# Patient Record
Sex: MALE | URBAN - METROPOLITAN AREA
[De-identification: names, ages, dates, MRNs, and addresses within clinical notes are randomized per-mention and may not be internally consistent; named-entity substitution may affect disease eponyms.]

---

## 2021-11-24 ENCOUNTER — DOCTOR'S OFFICE (OUTPATIENT)
Dept: URBAN - METROPOLITAN AREA CLINIC 157 | Facility: CLINIC | Age: 71
Setting detail: OPHTHALMOLOGY
End: 2021-11-24
Payer: COMMERCIAL

## 2021-11-24 PROBLEM — H43.811 VITREOUS DEGENERATION; RIGHT EYE: Status: ACTIVE | Noted: 2021-11-24

## 2021-11-24 PROBLEM — H04.123 DRY EYE SYNDROME LACRIMAL GLAND; BOTH EYES: Status: ACTIVE | Noted: 2021-11-24

## 2021-11-24 PROBLEM — Z96.1 PRESENCE OF INTRAOCULAR LENS ; BOTH EYES: Status: ACTIVE | Noted: 2021-11-24

## 2021-11-24 PROBLEM — H01.001 BLEPHARITIS; RIGHT UPPER LID, LEFT UPPER LID: Status: ACTIVE | Noted: 2021-11-24

## 2021-11-24 PROBLEM — H26.493 OTHER SECONDARY CATARACT; BOTH EYES: Status: ACTIVE | Noted: 2021-11-24

## 2021-11-24 PROBLEM — H01.004 BLEPHARITIS; RIGHT UPPER LID, LEFT UPPER LID: Status: ACTIVE | Noted: 2021-11-24

## 2021-11-24 PROCEDURE — 92014 COMPRE OPH EXAM EST PT 1/>: CPT | Performed by: OPTOMETRIST

## 2021-11-24 ASSESSMENT — SPHEQUIV_DERIVED
OS_SPHEQUIV: 0.25
OD_SPHEQUIV: 0.5

## 2021-11-24 ASSESSMENT — KERATOMETRY
OS_AXISANGLE_DEGREES: 159
OD_K1POWER_DIOPTERS: 42.75
OD_AXISANGLE_DEGREES: 032
OS_K2POWER_DIOPTERS: 44.00
OD_K2POWER_DIOPTERS: 43.00
OS_K1POWER_DIOPTERS: 43.00

## 2021-11-24 ASSESSMENT — REFRACTION_AUTOREFRACTION
OD_AXIS: 008
OS_AXIS: 160
OD_CYLINDER: +0.50
OD_SPHERE: +0.25
OS_CYLINDER: +1.50
OS_SPHERE: -0.50

## 2021-11-24 ASSESSMENT — TONOMETRY
OD_IOP_MMHG: 18
OS_IOP_MMHG: 15

## 2021-11-24 ASSESSMENT — CONFRONTATIONAL VISUAL FIELD TEST (CVF)
OS_FINDINGS: FULL
OD_FINDINGS: FULL

## 2021-11-24 ASSESSMENT — AXIALLENGTH_DERIVED
OD_AL: 23.6267
OS_AL: 23.4949

## 2021-11-24 ASSESSMENT — VISUAL ACUITY
OD_BCVA: 20/25
OS_BCVA: 20/25

## 2021-11-24 ASSESSMENT — TEAR BREAK UP TIME (TBUT)
OS_TBUT: 7SEC
OD_TBUT: 7SEC

## 2021-11-24 ASSESSMENT — LID EXAM ASSESSMENTS
OS_BLEPHARITIS: LUL T
OD_BLEPHARITIS: RUL T

## 2022-12-07 ENCOUNTER — DOCTOR'S OFFICE (OUTPATIENT)
Dept: URBAN - METROPOLITAN AREA CLINIC 161 | Facility: CLINIC | Age: 72
Setting detail: OPHTHALMOLOGY
End: 2022-12-07
Payer: COMMERCIAL

## 2022-12-07 DIAGNOSIS — H01.004: ICD-10-CM

## 2022-12-07 DIAGNOSIS — H01.001: ICD-10-CM

## 2022-12-07 DIAGNOSIS — H40.013: ICD-10-CM

## 2022-12-07 DIAGNOSIS — H43.811: ICD-10-CM

## 2022-12-07 DIAGNOSIS — Z96.1: ICD-10-CM

## 2022-12-07 DIAGNOSIS — H04.123: ICD-10-CM

## 2022-12-07 DIAGNOSIS — H26.493: ICD-10-CM

## 2022-12-07 PROCEDURE — 92133 CPTRZD OPH DX IMG PST SGM ON: CPT | Performed by: OPTOMETRIST

## 2022-12-07 PROCEDURE — 92014 COMPRE OPH EXAM EST PT 1/>: CPT | Performed by: OPTOMETRIST

## 2022-12-07 ASSESSMENT — LID EXAM ASSESSMENTS
OD_BLEPHARITIS: RUL T
OS_BLEPHARITIS: LUL T

## 2022-12-07 ASSESSMENT — REFRACTION_MANIFEST
OS_VA1: 20/20
OS_SPHERE: +0.25
OS_ADD: +2.50
OD_CYLINDER: +1.00
OD_SPHERE: +0.75
OS_CYLINDER: +1.25
OD_AXIS: 030
OS_AXIS: 165
OD_VA1: 20/20
OD_ADD: +2.50

## 2022-12-07 ASSESSMENT — SPHEQUIV_DERIVED
OS_SPHEQUIV: 0.875
OD_SPHEQUIV: 1.25
OS_SPHEQUIV: 0.5
OD_SPHEQUIV: 0.875

## 2022-12-07 ASSESSMENT — TEAR BREAK UP TIME (TBUT)
OD_TBUT: 7SEC
OS_TBUT: 7SEC

## 2022-12-07 ASSESSMENT — AXIALLENGTH_DERIVED
OS_AL: 23.3536
OD_AL: 23.4357
OD_AL: 23.2924
OS_AL: 23.2113

## 2022-12-07 ASSESSMENT — KERATOMETRY
OD_K2POWER_DIOPTERS: 43.00
OD_AXISANGLE_DEGREES: 032
OD_K1POWER_DIOPTERS: 43.00
OS_AXISANGLE_DEGREES: 159
OS_K1POWER_DIOPTERS: 43.25
OS_K2POWER_DIOPTERS: 44.00

## 2022-12-07 ASSESSMENT — CONFRONTATIONAL VISUAL FIELD TEST (CVF)
OS_FINDINGS: FULL
OD_FINDINGS: FULL

## 2022-12-07 ASSESSMENT — REFRACTION_AUTOREFRACTION
OS_CYLINDER: +1.50
OD_AXIS: 020
OS_AXIS: 166
OD_SPHERE: +0.50
OS_SPHERE: -0.25
OD_CYLINDER: +0.75

## 2022-12-07 ASSESSMENT — VISUAL ACUITY
OD_BCVA: 20/40
OS_BCVA: 20/40-2

## 2023-01-04 ENCOUNTER — DOCTOR'S OFFICE (OUTPATIENT)
Dept: URBAN - METROPOLITAN AREA CLINIC 161 | Facility: CLINIC | Age: 73
Setting detail: OPHTHALMOLOGY
End: 2023-01-04
Payer: COMMERCIAL

## 2023-01-04 DIAGNOSIS — H40.013: ICD-10-CM

## 2023-01-04 PROCEDURE — 76514 ECHO EXAM OF EYE THICKNESS: CPT | Performed by: OPTOMETRIST

## 2023-01-04 PROCEDURE — 99213 OFFICE O/P EST LOW 20 MIN: CPT | Performed by: OPTOMETRIST

## 2023-01-04 PROCEDURE — 92083 EXTENDED VISUAL FIELD XM: CPT | Performed by: OPTOMETRIST

## 2023-01-04 ASSESSMENT — REFRACTION_AUTOREFRACTION
OD_AXIS: 020
OD_SPHERE: +0.50
OS_SPHERE: -0.25
OD_CYLINDER: +0.75
OS_CYLINDER: +1.50
OS_AXIS: 166

## 2023-01-04 ASSESSMENT — REFRACTION_MANIFEST
OD_VA1: 20/20
OD_SPHERE: +0.75
OS_CYLINDER: +1.25
OD_AXIS: 030
OD_CYLINDER: +1.00
OS_AXIS: 165
OS_SPHERE: +0.25
OD_ADD: +2.50
OS_ADD: +2.50
OS_VA1: 20/20

## 2023-01-04 ASSESSMENT — LID EXAM ASSESSMENTS
OD_BLEPHARITIS: RUL T
OS_BLEPHARITIS: LUL T

## 2023-01-04 ASSESSMENT — KERATOMETRY
OS_AXISANGLE_DEGREES: 159
OS_K1POWER_DIOPTERS: 43.25
OD_K2POWER_DIOPTERS: 43.00
OD_K1POWER_DIOPTERS: 43.00
OD_AXISANGLE_DEGREES: 032
OS_K2POWER_DIOPTERS: 44.00

## 2023-01-04 ASSESSMENT — PACHYMETRY
OS_CT_UM: 512
OD_CT_UM: 543
OD_CT_CORRECTION: 0
OS_CT_CORRECTION: 2

## 2023-01-04 ASSESSMENT — AXIALLENGTH_DERIVED
OD_AL: 23.4357
OS_AL: 23.3536
OS_AL: 23.2113
OD_AL: 23.2924

## 2023-01-04 ASSESSMENT — TONOMETRY
OS_IOP_MMHG: 21
OS_IOP_MMHG: 18
OD_IOP_MMHG: 21

## 2023-01-04 ASSESSMENT — SPHEQUIV_DERIVED
OS_SPHEQUIV: 0.5
OD_SPHEQUIV: 1.25
OD_SPHEQUIV: 0.875
OS_SPHEQUIV: 0.875

## 2023-01-04 ASSESSMENT — VISUAL ACUITY
OS_BCVA: 20/40-2
OD_BCVA: 20/40

## 2023-01-04 ASSESSMENT — TEAR BREAK UP TIME (TBUT)
OD_TBUT: 7SEC
OS_TBUT: 7SEC

## 2023-01-04 ASSESSMENT — CONFRONTATIONAL VISUAL FIELD TEST (CVF)
OS_FINDINGS: FULL
OD_FINDINGS: FULL

## 2023-01-23 ENCOUNTER — OFFICE VISIT (OUTPATIENT)
Dept: CARDIOLOGY | Facility: CLINIC | Age: 73
End: 2023-01-23
Payer: MEDICARE

## 2023-01-23 VITALS
WEIGHT: 183 LBS | OXYGEN SATURATION: 89 % | DIASTOLIC BLOOD PRESSURE: 68 MMHG | HEIGHT: 65 IN | BODY MASS INDEX: 30.49 KG/M2 | HEART RATE: 68 BPM | SYSTOLIC BLOOD PRESSURE: 120 MMHG

## 2023-01-23 DIAGNOSIS — R29.898 WEAKNESS OF BOTH LOWER EXTREMITIES: ICD-10-CM

## 2023-01-23 DIAGNOSIS — M79.604 BILATERAL LEG PAIN: ICD-10-CM

## 2023-01-23 DIAGNOSIS — I73.9 CLAUDICATION: ICD-10-CM

## 2023-01-23 DIAGNOSIS — R06.02 SHORTNESS OF BREATH: Primary | ICD-10-CM

## 2023-01-23 DIAGNOSIS — I10 PRIMARY HYPERTENSION: ICD-10-CM

## 2023-01-23 DIAGNOSIS — M79.605 BILATERAL LEG PAIN: ICD-10-CM

## 2023-01-23 PROCEDURE — 99204 OFFICE O/P NEW MOD 45 MIN: CPT | Performed by: PHYSICIAN ASSISTANT

## 2023-01-23 PROCEDURE — 93000 ELECTROCARDIOGRAM COMPLETE: CPT | Performed by: PHYSICIAN ASSISTANT

## 2023-01-23 RX ORDER — PREDNISONE 1 MG/1
5 TABLET ORAL DAILY
COMMUNITY
Start: 2022-12-28

## 2023-01-23 RX ORDER — SERTRALINE HYDROCHLORIDE 100 MG/1
200 TABLET, FILM COATED ORAL DAILY
COMMUNITY
Start: 2022-11-28

## 2023-01-23 RX ORDER — ATORVASTATIN CALCIUM 80 MG/1
80 TABLET, FILM COATED ORAL DAILY
COMMUNITY
Start: 2022-11-28

## 2023-01-23 RX ORDER — CELECOXIB 200 MG/1
200 CAPSULE ORAL DAILY
COMMUNITY
Start: 2022-11-23

## 2023-01-23 RX ORDER — OMEPRAZOLE 20 MG/1
CAPSULE, DELAYED RELEASE ORAL
COMMUNITY
Start: 2022-12-28

## 2023-01-23 RX ORDER — AMLODIPINE BESYLATE 5 MG/1
5 TABLET ORAL DAILY
COMMUNITY
Start: 2022-11-28

## 2023-01-23 NOTE — PROGRESS NOTES
Problem list     Subjective   Marvel Joy is a 72 y.o. male     Chief Complaint   Patient presents with   • Establish Care     SOB       HPI    The patient presents in the clinic today to establish cardiovascular care.  The patient is referred by his primary care provider for evaluation because of dyspnea, to ensure that this is not cardiac related.  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  By his description, he had a pericardiectomy.  I have no records to confirm this.  The patient had done well up until recently.  Over the last few weeks to few months, he has started noticing progressive dyspnea.  This now limits activity.  This has become severe.  He denies associated PND, orthopnea, or significant lower extremity edema.  At times he will have palpitations but no sustained dysrhythmic activity.  He will have chest tightness at times but nothing of significance.  Symptoms do appear to be progressing.  He went to see his primary care provider.  With his history of cardiac intervention for effusion as above, repeat cardiac evaluation was recommended.  This gentleman presents today in that setting.  His only other concern at this time is with lower extremity pain and weakness at times.  He is concerned about a potential circulatory issue contributing to the same.  He has nothing to suggest acute arterial insufficiency at this time.  The patient has no further complaints.  Current Outpatient Medications on File Prior to Visit   Medication Sig Dispense Refill   • amLODIPine (NORVASC) 5 MG tablet Take 5 mg by mouth Daily.     • atorvastatin (LIPITOR) 80 MG tablet Take 80 mg by mouth Daily.     • celecoxib (CeleBREX) 200 MG capsule Take 200 mg by mouth Daily.     • omeprazole (priLOSEC) 20 MG capsule TAKE 1 Capsule BY MOUTH DAILY 30 MINUTES BEFORE MORNING MEAL     • predniSONE (DELTASONE) 5 MG tablet Take 5 mg by mouth Daily.     • sertraline (ZOLOFT)  "100 MG tablet Take 200 mg by mouth Daily.       No current facility-administered medications on file prior to visit.       Patient has no known allergies.    Past Medical History:   Diagnosis Date   • Arthritis    • H/O prostate cancer    • Hypertension    • Stroke (HCC)        Social History     Socioeconomic History   • Marital status:    Tobacco Use   • Smoking status: Former     Years: 5.00     Types: Cigarettes   • Smokeless tobacco: Never   Substance and Sexual Activity   • Alcohol use: Not Currently   • Drug use: Never   • Sexual activity: Defer       Family History   Problem Relation Age of Onset   • Cancer Mother    • Stroke Father        Review of Systems   Eyes: Negative for discharge and itching.   Respiratory: Positive for shortness of breath.    Cardiovascular: Negative for chest pain, palpitations and leg swelling.   Gastrointestinal: Negative.  Negative for blood in stool.   Endocrine: Negative.  Negative for cold intolerance and heat intolerance.   Genitourinary: Negative.  Negative for hematuria.   Musculoskeletal: Negative.    Skin: Positive for color change. Negative for rash and wound.   Allergic/Immunologic: Negative for environmental allergies and food allergies.   Neurological: Negative.  Negative for dizziness, syncope, weakness, light-headedness, numbness and headaches.   Hematological: Bruises/bleeds easily.   Psychiatric/Behavioral: Negative.  Negative for sleep disturbance.       Objective   Vitals:    01/23/23 1458   BP: 120/68   BP Location: Left arm   Patient Position: Sitting   Cuff Size: Adult   Pulse: 68   SpO2: (!) 89%   Weight: 83 kg (183 lb)   Height: 165.1 cm (65\")      /68 (BP Location: Left arm, Patient Position: Sitting, Cuff Size: Adult)   Pulse 68   Ht 165.1 cm (65\")   Wt 83 kg (183 lb)   SpO2 (!) 89%   BMI 30.45 kg/m²    Lab Results (most recent)     None        Physical Exam  Vitals and nursing note reviewed.   Constitutional:       General: He is not " in acute distress.     Appearance: He is well-developed.   HENT:      Head: Normocephalic and atraumatic.   Eyes:      Conjunctiva/sclera: Conjunctivae normal.      Pupils: Pupils are equal, round, and reactive to light.   Neck:      Vascular: No JVD.      Trachea: No tracheal deviation.   Cardiovascular:      Rate and Rhythm: Normal rate and regular rhythm.      Heart sounds: Normal heart sounds.      Comments: Soft systolic murmur noted second right costal space lower left sternal border.  I cannot exclude a soft murmur of AI today, although I cannot get this to alter with maneuvers  Pulmonary:      Effort: Pulmonary effort is normal.      Breath sounds: Normal breath sounds.   Abdominal:      General: Bowel sounds are normal. There is no distension.      Palpations: Abdomen is soft. There is no mass.      Tenderness: There is no abdominal tenderness. There is no guarding or rebound.   Musculoskeletal:         General: No tenderness or deformity. Normal range of motion.      Cervical back: Normal range of motion and neck supple.   Skin:     General: Skin is warm and dry.      Coloration: Skin is not pale.      Findings: No erythema or rash.   Neurological:      Mental Status: He is alert and oriented to person, place, and time.   Psychiatric:         Behavior: Behavior normal.         Thought Content: Thought content normal.         Judgment: Judgment normal.           Procedure     ECG 12 Lead    Date/Time: 1/23/2023 3:07 PM  Performed by: Marvel Draper PA  Authorized by: Marvel Draper PA   Comments: Sinus rhythm, rate 60, probable normal axis, borderline short MS interval, no acute changes noted.               Assessment & Plan      Diagnosis Plan   1. Shortness of breath  Adult Transthoracic Echo Complete W/ Cont if Necessary Per Protocol    Stress Test With Myocardial Perfusion One Day    Duplex Lower Extremity Art / Grafts - Bilateral CAR      2. Weakness of both lower extremities  Adult Transthoracic  Echo Complete W/ Cont if Necessary Per Protocol    Stress Test With Myocardial Perfusion One Day    Duplex Lower Extremity Art / Grafts - Bilateral CAR      3. Bilateral leg pain  Duplex Lower Extremity Art / Grafts - Bilateral CAR      4. Primary hypertension  Duplex Lower Extremity Art / Grafts - Bilateral CAR      5. Claudication (HCC)  Duplex Lower Extremity Art / Grafts - Bilateral CAR        1.  The patient is referred to the clinic today to establish cardiovascular care.  There is concerned that his dyspnea is cardiac related, in particular an anginal equivalent symptom.  There is also concern given history of intervention for pericardial effusion.  By his description, he apparently had a pericardiectomy, although I have no operative report from 2006 to confirm this.  We are attempting to obtain this.  This may be difficult to obtain however.    2.  In this setting, we will schedule for nuclear stress test.  He could no fashion tolerate treadmill protocol.  He will be scheduled for Lexiscan protocol for ischemia assessment.    3.  I would also schedule for an echo.  We can reevaluate LV size and function, valvular morphologies, and cardiac structure otherwise.    4.  He is most concerned about lower extremity discomfort and weakness.  He is worried about the potential of vascular disease.  We will schedule for arterial duplex in that setting.    5.  I would continue medical regimen without change.    6.  We will continue to see this gentleman on in follow-up after the above studies are available.  We can recommend him further at that time           Patient brought in medicine list to appointment, it's been reviewed with patient and med list was updated in the chart.     Advance Care Planning   ACP discussion was declined by the patient. Patient does not have an advance directive, declines further assistance.           Electronically signed by:

## 2023-02-22 ENCOUNTER — HOSPITAL ENCOUNTER (OUTPATIENT)
Dept: CARDIOLOGY | Facility: HOSPITAL | Age: 73
Discharge: HOME | End: 2023-02-22
Payer: MEDICARE

## 2023-02-22 DIAGNOSIS — R29.898 WEAKNESS OF BOTH LOWER EXTREMITIES: ICD-10-CM

## 2023-02-22 DIAGNOSIS — I10 PRIMARY HYPERTENSION: ICD-10-CM

## 2023-02-22 DIAGNOSIS — R06.02 SHORTNESS OF BREATH: ICD-10-CM

## 2023-02-22 DIAGNOSIS — M79.604 BILATERAL LEG PAIN: ICD-10-CM

## 2023-02-22 DIAGNOSIS — I73.9 CLAUDICATION: ICD-10-CM

## 2023-02-22 DIAGNOSIS — M79.605 BILATERAL LEG PAIN: ICD-10-CM

## 2023-02-22 LAB
BH CV REST NUCLEAR ISOTOPE DOSE: 10 MCI
BH CV STRESS COMMENTS STAGE 1: NORMAL
BH CV STRESS DOSE REGADENOSON STAGE 1: 0.4
BH CV STRESS DURATION MIN STAGE 1: 0
BH CV STRESS DURATION SEC STAGE 1: 10
BH CV STRESS NUCLEAR ISOTOPE DOSE: 30 MCI
BH CV STRESS PROTOCOL 1: NORMAL
BH CV STRESS RECOVERY BP: NORMAL MMHG
BH CV STRESS RECOVERY HR: 64 BPM
BH CV STRESS STAGE 1: 1
MAXIMAL PREDICTED HEART RATE: 149 BPM
PERCENT MAX PREDICTED HR: 46.98 %
STRESS BASELINE BP: NORMAL MMHG
STRESS BASELINE HR: 58 BPM
STRESS PERCENT HR: 55 %
STRESS POST PEAK BP: NORMAL MMHG
STRESS POST PEAK HR: 70 BPM
STRESS TARGET HR: 127 BPM

## 2023-02-22 PROCEDURE — 0 TECHNETIUM SESTAMIBI: Performed by: INTERNAL MEDICINE

## 2023-02-22 PROCEDURE — 93925 LOWER EXTREMITY STUDY: CPT

## 2023-02-22 PROCEDURE — 78452 HT MUSCLE IMAGE SPECT MULT: CPT | Performed by: INTERNAL MEDICINE

## 2023-02-22 PROCEDURE — 93925 LOWER EXTREMITY STUDY: CPT | Performed by: INTERNAL MEDICINE

## 2023-02-22 PROCEDURE — 93018 CV STRESS TEST I&R ONLY: CPT | Performed by: INTERNAL MEDICINE

## 2023-02-22 PROCEDURE — 93306 TTE W/DOPPLER COMPLETE: CPT | Performed by: INTERNAL MEDICINE

## 2023-02-22 PROCEDURE — 25010000002 REGADENOSON 0.4 MG/5ML SOLUTION: Performed by: INTERNAL MEDICINE

## 2023-02-22 PROCEDURE — A9500 TC99M SESTAMIBI: HCPCS | Performed by: INTERNAL MEDICINE

## 2023-02-22 PROCEDURE — 93306 TTE W/DOPPLER COMPLETE: CPT

## 2023-02-22 PROCEDURE — 78452 HT MUSCLE IMAGE SPECT MULT: CPT

## 2023-02-22 PROCEDURE — 93017 CV STRESS TEST TRACING ONLY: CPT

## 2023-02-22 RX ADMIN — REGADENOSON 0.4 MG: 0.08 INJECTION, SOLUTION INTRAVENOUS at 12:18

## 2023-02-22 RX ADMIN — TECHNETIUM TC 99M SESTAMIBI 1 DOSE: 1 INJECTION INTRAVENOUS at 08:08

## 2023-02-22 RX ADMIN — TECHNETIUM TC 99M SESTAMIBI 1 DOSE: 1 INJECTION INTRAVENOUS at 12:18

## 2023-02-24 LAB
BH CV ECHO MEAS - ACS: 2.39 CM
BH CV ECHO MEAS - AO MAX PG: 7.1 MMHG
BH CV ECHO MEAS - AO MEAN PG: 3.8 MMHG
BH CV ECHO MEAS - AO ROOT DIAM: 4.1 CM
BH CV ECHO MEAS - AO V2 MAX: 133 CM/SEC
BH CV ECHO MEAS - AO V2 VTI: 25.3 CM
BH CV ECHO MEAS - EDV(CUBED): 75.7 ML
BH CV ECHO MEAS - EDV(MOD-SP4): 108 ML
BH CV ECHO MEAS - EF(MOD-SP4): 70.2 %
BH CV ECHO MEAS - EF_3D-VOL: 58 %
BH CV ECHO MEAS - ESV(CUBED): 28.1 ML
BH CV ECHO MEAS - ESV(MOD-SP4): 32.2 ML
BH CV ECHO MEAS - FS: 28.1 %
BH CV ECHO MEAS - IVS/LVPW: 0.79 CM
BH CV ECHO MEAS - IVSD: 0.8 CM
BH CV ECHO MEAS - LA DIMENSION: 3.7 CM
BH CV ECHO MEAS - LAT PEAK E' VEL: 6.6 CM/SEC
BH CV ECHO MEAS - LV DIASTOLIC VOL/BSA (35-75): 54.9 CM2
BH CV ECHO MEAS - LV MASS(C)D: 121 GRAMS
BH CV ECHO MEAS - LV SYSTOLIC VOL/BSA (12-30): 16.4 CM2
BH CV ECHO MEAS - LVIDD: 4.2 CM
BH CV ECHO MEAS - LVIDS: 3 CM
BH CV ECHO MEAS - LVOT AREA: 4.5 CM2
BH CV ECHO MEAS - LVOT DIAM: 2.4 CM
BH CV ECHO MEAS - LVPWD: 1.01 CM
BH CV ECHO MEAS - MED PEAK E' VEL: 8.8 CM/SEC
BH CV ECHO MEAS - MV A MAX VEL: 52.3 CM/SEC
BH CV ECHO MEAS - MV DEC SLOPE: 422.9 CM/SEC2
BH CV ECHO MEAS - MV E MAX VEL: 98.1 CM/SEC
BH CV ECHO MEAS - MV E/A: 1.88
BH CV ECHO MEAS - RVDD: 2.41 CM
BH CV ECHO MEAS - SI(MOD-SP4): 38.5 ML/M2
BH CV ECHO MEAS - SV(MOD-SP4): 75.8 ML
BH CV ECHO MEASUREMENTS AVERAGE E/E' RATIO: 12.74
BH CV LEA LEFT ANT TIBIAL A DISTAL EDV: 8.8 CM/S
BH CV LEA LEFT ANT TIBIAL A DISTAL PSV: 73.2 CM/S
BH CV LEA LEFT CFA PROX PSV: 138 CM/S
BH CV LEA LEFT DFA PROX PSV: 67 CM/S
BH CV LEA LEFT POPITEAL A  PROX PSV: 69 CM/S
BH CV LEA LEFT PTA DISTAL EDV: 10.5 CM/S
BH CV LEA LEFT PTA DISTAL PSV: 75.4 CM/S
BH CV LEA LEFT SFA DISTAL EDV: -9.1 CM/S
BH CV LEA LEFT SFA DISTAL PSV: -88 CM/S
BH CV LEA LEFT SFA MID EDV: -10.5 CM/S
BH CV LEA LEFT SFA MID PSV: -83.1 CM/S
BH CV LEA LEFT SFA PROX EDV: -10.5 CM/S
BH CV LEA LEFT SFA PROX PSV: -106.1 CM/S
BH CV LEA RIGHT ANT TIBIAL A DISTAL EDV: 8.8 CM/S
BH CV LEA RIGHT ANT TIBIAL A DISTAL PSV: 86.9 CM/S
BH CV LEA RIGHT CFA PROX PSV: 137 CM/S
BH CV LEA RIGHT DFA PROX PSV: 58 CM/S
BH CV LEA RIGHT POPITEAL A  PROX PSV: 67 CM/S
BH CV LEA RIGHT PTA DISTAL EDV: 9.8 CM/S
BH CV LEA RIGHT PTA DISTAL PSV: 66.3 CM/S
BH CV LEA RIGHT SFA DISTAL EDV: -13.3 CM/S
BH CV LEA RIGHT SFA DISTAL PSV: -106.1 CM/S
BH CV LEA RIGHT SFA MID EDV: -13.3 CM/S
BH CV LEA RIGHT SFA MID PSV: -92.9 CM/S
BH CV LEA RIGHT SFA PROX EDV: 14.7 CM/S
BH CV LEA RIGHT SFA PROX PSV: 100.6 CM/S
LEFT ATRIUM VOLUME INDEX: 38.3 ML/M2
LEFT GROIN CFA SYS: 139.1 CM/SEC
MAXIMAL PREDICTED HEART RATE: 149 BPM
MAXIMAL PREDICTED HEART RATE: 149 BPM
RIGHT GROIN CFA SYS: 137.9 CM/SEC
STRESS TARGET HR: 127 BPM
STRESS TARGET HR: 127 BPM

## 2023-03-01 ENCOUNTER — TELEPHONE (OUTPATIENT)
Dept: CARDIOLOGY | Facility: CLINIC | Age: 73
End: 2023-03-01
Payer: MEDICARE

## 2023-03-01 NOTE — TELEPHONE ENCOUNTER
STRESS/US ARTERIAL  Pt notified of no acute findings. Provider will discuss results at f/u. Pt reminded of appt date and time.  ----- Message from Briana Case MA sent at 2/27/2023  8:17 AM EST -----    ----- Message -----  From: Marvel Draper PA  Sent: 2/24/2023   3:10 PM EST  To: Briana Case MA    Routine follow-up.  ----- Message -----  From: Ian Hale MD  Sent: 2/22/2023   9:05 PM EST  To: AVI Martinez

## 2023-03-02 ENCOUNTER — TELEPHONE (OUTPATIENT)
Dept: CARDIOLOGY | Facility: CLINIC | Age: 73
End: 2023-03-02
Payer: MEDICARE

## 2023-03-02 NOTE — TELEPHONE ENCOUNTER
ECHO  Pt notified of no acute findings. Provider will discuss results at f/u. Pt reminded of appt date and time.  ----- Message from Briana Case MA sent at 3/2/2023  8:13 AM EST -----    ----- Message -----  From: Marvel Draper PA  Sent: 3/1/2023   5:58 PM EST  To: Briana Case MA    Routine follow-up.  ----- Message -----  From: Ian Hale MD  Sent: 2/24/2023   9:59 PM EST  To: AVI Martinez

## 2023-04-05 ENCOUNTER — RX ONLY (RX ONLY)
Age: 73
End: 2023-04-05

## 2023-04-05 ENCOUNTER — DOCTOR'S OFFICE (OUTPATIENT)
Dept: URBAN - METROPOLITAN AREA CLINIC 161 | Facility: CLINIC | Age: 73
Setting detail: OPHTHALMOLOGY
End: 2023-04-05
Payer: COMMERCIAL

## 2023-04-05 DIAGNOSIS — H40.012: ICD-10-CM

## 2023-04-05 DIAGNOSIS — H40.1112: ICD-10-CM

## 2023-04-05 PROCEDURE — 99213 OFFICE O/P EST LOW 20 MIN: CPT | Performed by: OPTOMETRIST

## 2023-04-05 ASSESSMENT — REFRACTION_MANIFEST
OD_SPHERE: +0.75
OS_CYLINDER: +1.25
OD_ADD: +2.50
OS_SPHERE: +0.25
OS_ADD: +2.50
OD_VA1: 20/20
OS_AXIS: 165
OD_AXIS: 030
OS_VA1: 20/20
OD_CYLINDER: +1.00

## 2023-04-05 ASSESSMENT — REFRACTION_AUTOREFRACTION
OD_AXIS: 020
OD_SPHERE: +0.50
OS_SPHERE: -0.25
OD_CYLINDER: +0.75
OS_AXIS: 166
OS_CYLINDER: +1.50

## 2023-04-05 ASSESSMENT — PACHYMETRY
OD_CT_UM: 543
OS_CT_CORRECTION: 2
OS_CT_UM: 512
OD_CT_CORRECTION: 0

## 2023-04-05 ASSESSMENT — TONOMETRY
OS_IOP_MMHG: 16
OD_IOP_MMHG: 17
OD_IOP_MMHG: 16
OS_IOP_MMHG: 15

## 2023-04-05 ASSESSMENT — LID EXAM ASSESSMENTS
OS_BLEPHARITIS: LUL T
OD_BLEPHARITIS: RUL T

## 2023-04-05 ASSESSMENT — SPHEQUIV_DERIVED
OS_SPHEQUIV: 0.875
OS_SPHEQUIV: 0.5
OD_SPHEQUIV: 1.25
OD_SPHEQUIV: 0.875

## 2023-04-05 ASSESSMENT — CONFRONTATIONAL VISUAL FIELD TEST (CVF)
OD_FINDINGS: FULL
OS_FINDINGS: FULL

## 2023-04-05 ASSESSMENT — KERATOMETRY
OD_K2POWER_DIOPTERS: 43.00
OS_AXISANGLE_DEGREES: 159
OS_K2POWER_DIOPTERS: 44.00
OD_K1POWER_DIOPTERS: 43.00
OS_K1POWER_DIOPTERS: 43.25
OD_AXISANGLE_DEGREES: 032

## 2023-04-05 ASSESSMENT — TEAR BREAK UP TIME (TBUT)
OS_TBUT: 7SEC
OD_TBUT: 7SEC

## 2023-04-05 ASSESSMENT — VISUAL ACUITY
OS_BCVA: 20/30-1
OD_BCVA: 20/25-1

## 2023-04-05 ASSESSMENT — AXIALLENGTH_DERIVED
OD_AL: 23.2924
OD_AL: 23.4357
OS_AL: 23.2113
OS_AL: 23.3536

## 2023-07-13 ENCOUNTER — DOCTOR'S OFFICE (OUTPATIENT)
Dept: URBAN - METROPOLITAN AREA CLINIC 161 | Facility: CLINIC | Age: 73
Setting detail: OPHTHALMOLOGY
End: 2023-07-13
Payer: COMMERCIAL

## 2023-07-13 DIAGNOSIS — H40.1112: ICD-10-CM

## 2023-07-13 DIAGNOSIS — H40.012: ICD-10-CM

## 2023-07-13 PROCEDURE — 92083 EXTENDED VISUAL FIELD XM: CPT | Performed by: OPTOMETRIST

## 2023-07-13 PROCEDURE — 99213 OFFICE O/P EST LOW 20 MIN: CPT | Performed by: OPTOMETRIST

## 2023-07-13 ASSESSMENT — REFRACTION_MANIFEST
OS_ADD: +2.50
OS_SPHERE: +0.25
OS_CYLINDER: +1.25
OD_AXIS: 030
OD_ADD: +2.50
OD_VA1: 20/20
OS_VA1: 20/20
OD_CYLINDER: +1.00
OS_AXIS: 165
OD_SPHERE: +0.75

## 2023-07-13 ASSESSMENT — TONOMETRY
OD_IOP_MMHG: 18
OD_IOP_MMHG: 18
OS_IOP_MMHG: 16
OS_IOP_MMHG: 18

## 2023-07-13 ASSESSMENT — REFRACTION_AUTOREFRACTION
OS_CYLINDER: +1.75
OD_AXIS: 014
OD_SPHERE: +0.25
OS_AXIS: 163
OS_SPHERE: -0.50
OD_CYLINDER: +1.00

## 2023-07-13 ASSESSMENT — SPHEQUIV_DERIVED
OD_SPHEQUIV: 0.75
OD_SPHEQUIV: 1.25
OS_SPHEQUIV: 0.375
OS_SPHEQUIV: 0.875

## 2023-07-13 ASSESSMENT — VISUAL ACUITY
OD_BCVA: 20/20
OS_BCVA: 20/25

## 2023-07-13 ASSESSMENT — CONFRONTATIONAL VISUAL FIELD TEST (CVF)
OD_FINDINGS: FULL
OS_FINDINGS: FULL

## 2023-07-13 ASSESSMENT — TEAR BREAK UP TIME (TBUT)
OS_TBUT: 7SEC
OD_TBUT: 7SEC

## 2023-07-13 ASSESSMENT — KERATOMETRY
OS_AXISANGLE_DEGREES: 156
OD_AXISANGLE_DEGREES: 017
OS_K2POWER_DIOPTERS: 44.00
OS_K1POWER_DIOPTERS: 43.00
OD_K2POWER_DIOPTERS: 43.25
OD_K1POWER_DIOPTERS: 43.00

## 2023-07-13 ASSESSMENT — AXIALLENGTH_DERIVED
OD_AL: 23.4384
OD_AL: 23.2477
OS_AL: 23.4467
OS_AL: 23.2559

## 2023-07-13 ASSESSMENT — PACHYMETRY
OD_CT_CORRECTION: 0
OS_CT_UM: 512
OD_CT_UM: 543
OS_CT_CORRECTION: 2

## 2023-07-13 ASSESSMENT — LID EXAM ASSESSMENTS
OD_BLEPHARITIS: RUL T
OS_BLEPHARITIS: LUL T

## 2023-08-11 ENCOUNTER — DOCTOR'S OFFICE (OUTPATIENT)
Dept: URBAN - METROPOLITAN AREA CLINIC 161 | Facility: CLINIC | Age: 73
Setting detail: OPHTHALMOLOGY
End: 2023-08-11
Payer: COMMERCIAL

## 2023-08-11 DIAGNOSIS — H40.012: ICD-10-CM

## 2023-08-11 DIAGNOSIS — H11.31: ICD-10-CM

## 2023-08-11 DIAGNOSIS — H40.1112: ICD-10-CM

## 2023-08-11 PROCEDURE — 92012 INTRM OPH EXAM EST PATIENT: CPT | Performed by: OPHTHALMOLOGY

## 2023-08-11 ASSESSMENT — KERATOMETRY
OD_AXISANGLE_DEGREES: 017
OD_K2POWER_DIOPTERS: 43.25
OS_AXISANGLE_DEGREES: 156
OS_K1POWER_DIOPTERS: 43.00
OS_K2POWER_DIOPTERS: 44.00
OD_K1POWER_DIOPTERS: 43.00

## 2023-08-11 ASSESSMENT — LID EXAM ASSESSMENTS
OD_BLEPHARITIS: RUL T
OS_BLEPHARITIS: LUL T

## 2023-08-11 ASSESSMENT — TEAR BREAK UP TIME (TBUT)
OD_TBUT: 7SEC
OS_TBUT: 7SEC

## 2023-08-11 ASSESSMENT — PACHYMETRY
OS_CT_CORRECTION: 2
OD_CT_UM: 543
OD_CT_CORRECTION: 0
OS_CT_UM: 512

## 2023-08-11 ASSESSMENT — AXIALLENGTH_DERIVED
OD_AL: 23.2477
OS_AL: 23.4467
OS_AL: 23.2559
OD_AL: 23.4384

## 2023-08-11 ASSESSMENT — REFRACTION_MANIFEST
OS_AXIS: 165
OS_CYLINDER: +1.25
OS_SPHERE: +0.25
OD_ADD: +2.50
OD_VA1: 20/20
OD_CYLINDER: +1.00
OS_VA1: 20/20
OS_ADD: +2.50
OD_SPHERE: +0.75
OD_AXIS: 030

## 2023-08-11 ASSESSMENT — VISUAL ACUITY
OD_BCVA: 20/25-2
OS_BCVA: 20/30+3

## 2023-08-11 ASSESSMENT — SPHEQUIV_DERIVED
OD_SPHEQUIV: 1.25
OD_SPHEQUIV: 0.75
OS_SPHEQUIV: 0.875
OS_SPHEQUIV: 0.375

## 2023-08-11 ASSESSMENT — REFRACTION_AUTOREFRACTION
OS_SPHERE: -0.50
OD_SPHERE: +0.25
OS_CYLINDER: +1.75
OS_AXIS: 163
OD_AXIS: 014
OD_CYLINDER: +1.00

## 2023-08-11 ASSESSMENT — TONOMETRY
OS_IOP_MMHG: 12
OD_IOP_MMHG: 17

## 2023-10-19 ENCOUNTER — DOCTOR'S OFFICE (OUTPATIENT)
Dept: URBAN - METROPOLITAN AREA CLINIC 161 | Facility: CLINIC | Age: 73
Setting detail: OPHTHALMOLOGY
End: 2023-10-19
Payer: COMMERCIAL

## 2023-10-19 DIAGNOSIS — H40.1112: ICD-10-CM

## 2023-10-19 DIAGNOSIS — H40.012: ICD-10-CM

## 2023-10-19 PROCEDURE — 92250 FUNDUS PHOTOGRAPHY W/I&R: CPT | Performed by: OPTOMETRIST

## 2023-10-19 PROCEDURE — 99213 OFFICE O/P EST LOW 20 MIN: CPT | Performed by: OPTOMETRIST

## 2023-10-19 ASSESSMENT — PACHYMETRY
OS_CT_UM: 512
OD_CT_UM: 543
OD_CT_CORRECTION: 0
OS_CT_CORRECTION: 2

## 2023-10-19 ASSESSMENT — SPHEQUIV_DERIVED
OD_SPHEQUIV: 1.25
OS_SPHEQUIV: 0.375
OS_SPHEQUIV: 0.875
OD_SPHEQUIV: 1

## 2023-10-19 ASSESSMENT — CONFRONTATIONAL VISUAL FIELD TEST (CVF)
OD_FINDINGS: FULL
OS_FINDINGS: FULL

## 2023-10-19 ASSESSMENT — REFRACTION_AUTOREFRACTION
OD_SPHERE: +0.50
OS_AXIS: 162
OD_CYLINDER: +1.00
OD_AXIS: 021
OS_CYLINDER: +1.25
OS_SPHERE: -0.25

## 2023-10-19 ASSESSMENT — REFRACTION_MANIFEST
OS_SPHERE: +0.25
OS_VA1: 20/20
OD_AXIS: 030
OS_CYLINDER: +1.25
OD_ADD: +2.50
OD_VA1: 20/20
OS_ADD: +2.50
OD_SPHERE: +0.75
OD_CYLINDER: +1.00
OS_AXIS: 165

## 2023-10-19 ASSESSMENT — TONOMETRY
OD_IOP_MMHG: 18
OD_IOP_MMHG: 18
OS_IOP_MMHG: 12
OS_IOP_MMHG: 16

## 2023-10-19 ASSESSMENT — TEAR BREAK UP TIME (TBUT)
OD_TBUT: 7SEC
OS_TBUT: 7SEC

## 2023-10-19 ASSESSMENT — AXIALLENGTH_DERIVED
OD_AL: 23.3877
OS_AL: 23.4467
OD_AL: 23.2924
OS_AL: 23.2559

## 2023-10-19 ASSESSMENT — VISUAL ACUITY
OD_BCVA: 20/30
OS_BCVA: 20/30

## 2023-10-19 ASSESSMENT — KERATOMETRY
OD_K2POWER_DIOPTERS: 43.25
OD_K1POWER_DIOPTERS: 42.75
OS_AXISANGLE_DEGREES: 158
OS_K1POWER_DIOPTERS: 43.00
OS_K2POWER_DIOPTERS: 44.00
OD_AXISANGLE_DEGREES: 027

## 2023-10-19 ASSESSMENT — LID EXAM ASSESSMENTS
OS_BLEPHARITIS: LUL T
OD_BLEPHARITIS: RUL T

## 2024-02-15 ENCOUNTER — DOCTOR'S OFFICE (OUTPATIENT)
Dept: URBAN - METROPOLITAN AREA CLINIC 161 | Facility: CLINIC | Age: 74
Setting detail: OPHTHALMOLOGY
End: 2024-02-15
Payer: COMMERCIAL

## 2024-02-15 DIAGNOSIS — Z96.1: ICD-10-CM

## 2024-02-15 DIAGNOSIS — H01.004: ICD-10-CM

## 2024-02-15 DIAGNOSIS — H26.493: ICD-10-CM

## 2024-02-15 DIAGNOSIS — H04.123: ICD-10-CM

## 2024-02-15 DIAGNOSIS — H40.012: ICD-10-CM

## 2024-02-15 DIAGNOSIS — H40.1112: ICD-10-CM

## 2024-02-15 DIAGNOSIS — H43.811: ICD-10-CM

## 2024-02-15 DIAGNOSIS — H01.001: ICD-10-CM

## 2024-02-15 PROCEDURE — 92133 CPTRZD OPH DX IMG PST SGM ON: CPT | Performed by: OPTOMETRIST

## 2024-02-15 PROCEDURE — 92014 COMPRE OPH EXAM EST PT 1/>: CPT | Performed by: OPTOMETRIST

## 2024-02-15 ASSESSMENT — CONFRONTATIONAL VISUAL FIELD TEST (CVF)
OD_FINDINGS: FULL
OS_FINDINGS: FULL

## 2024-02-15 ASSESSMENT — LID EXAM ASSESSMENTS
OS_BLEPHARITIS: LUL T
OD_BLEPHARITIS: RUL T

## 2024-02-15 ASSESSMENT — TEAR BREAK UP TIME (TBUT)
OS_TBUT: 7SEC
OD_TBUT: 7SEC

## 2024-02-21 PROBLEM — H52.4 PRESBYOPIA: Status: ACTIVE | Noted: 2024-02-15

## 2024-02-21 ASSESSMENT — REFRACTION_MANIFEST
OD_ADD: +2.50
OD_AXIS: 030
OD_CYLINDER: +1.00
OS_ADD: +2.50
OS_CYLINDER: +1.25
OD_VA1: 20/20
OS_ADD: +2.50
OS_CYLINDER: +1.25
OS_AXIS: 165
OD_CYLINDER: +1.00
OD_AXIS: 030
OS_SPHERE: +0.25
OS_VA1: 20/20
OD_SPHERE: +0.75
OD_ADD: +2.50
OS_VA1: 20/20
OS_AXIS: 165
OD_SPHERE: +0.75
OD_VA1: 20/20
OS_SPHERE: +0.25

## 2024-02-21 ASSESSMENT — REFRACTION_CURRENTRX
OS_SPHERE: +3.00
OD_VPRISM_DIRECTION: SV
OD_SPHERE: +3.25
OD_CYLINDER: SPH
OS_VPRISM_DIRECTION: SV
OS_CYLINDER: SPH
OS_OVR_VA: 20/
OD_OVR_VA: 20/

## 2024-02-21 ASSESSMENT — SPHEQUIV_DERIVED
OD_SPHEQUIV: 0.75
OD_SPHEQUIV: 1.25
OD_SPHEQUIV: 1.25
OS_SPHEQUIV: 0.875
OS_SPHEQUIV: 0.875
OS_SPHEQUIV: 0.375

## 2024-02-21 ASSESSMENT — REFRACTION_AUTOREFRACTION
OS_CYLINDER: +1.25
OD_CYLINDER: +1.00
OS_AXIS: 159
OS_SPHERE: -0.25
OD_AXIS: 019
OD_SPHERE: +0.25

## 2024-08-15 ENCOUNTER — DOCTOR'S OFFICE (OUTPATIENT)
Dept: URBAN - METROPOLITAN AREA CLINIC 161 | Facility: CLINIC | Age: 74
Setting detail: OPHTHALMOLOGY
End: 2024-08-15
Payer: COMMERCIAL

## 2024-08-15 DIAGNOSIS — H40.1112: ICD-10-CM

## 2024-08-15 DIAGNOSIS — H40.012: ICD-10-CM

## 2024-08-15 PROCEDURE — 99213 OFFICE O/P EST LOW 20 MIN: CPT | Performed by: OPTOMETRIST

## 2024-08-15 PROCEDURE — 92083 EXTENDED VISUAL FIELD XM: CPT | Performed by: OPTOMETRIST

## 2024-08-15 ASSESSMENT — CONFRONTATIONAL VISUAL FIELD TEST (CVF)
OS_FINDINGS: FULL
OD_FINDINGS: FULL

## 2024-08-15 ASSESSMENT — LID EXAM ASSESSMENTS
OS_BLEPHARITIS: LUL T
OD_BLEPHARITIS: RUL T

## 2025-01-03 ENCOUNTER — DOCTOR'S OFFICE (OUTPATIENT)
Dept: URBAN - METROPOLITAN AREA CLINIC 161 | Facility: CLINIC | Age: 75
Setting detail: OPHTHALMOLOGY
End: 2025-01-03
Payer: COMMERCIAL

## 2025-01-03 DIAGNOSIS — H10.45: ICD-10-CM

## 2025-01-03 DIAGNOSIS — H40.1112: ICD-10-CM

## 2025-01-03 DIAGNOSIS — H40.012: ICD-10-CM

## 2025-01-03 PROBLEM — H16.223 KERATOCONJUNCTIVITIS SICCA NOT SPECIFIED AS SJORGRENS; BOTH EYES: Status: ACTIVE | Noted: 2025-01-03

## 2025-01-03 PROCEDURE — 92012 INTRM OPH EXAM EST PATIENT: CPT | Performed by: OPTOMETRIST

## 2025-01-03 ASSESSMENT — REFRACTION_CURRENTRX
OS_OVR_VA: 20/
OS_CYLINDER: SPH
OD_SPHERE: +3.25
OD_CYLINDER: SPH
OS_SPHERE: +3.00
OS_VPRISM_DIRECTION: SV
OD_VPRISM_DIRECTION: SV
OD_OVR_VA: 20/

## 2025-01-03 ASSESSMENT — KERATOMETRY
OD_K2POWER_DIOPTERS: 43.75
OS_K2POWER_DIOPTERS: 44.00
OS_AXISANGLE_DEGREES: 148
OD_AXISANGLE_DEGREES: 020
OD_K1POWER_DIOPTERS: 42.75
OS_K1POWER_DIOPTERS: 43.25

## 2025-01-03 ASSESSMENT — TEAR BREAK UP TIME (TBUT)
OS_TBUT: 7SEC
OD_TBUT: 7SEC

## 2025-01-03 ASSESSMENT — REFRACTION_MANIFEST
OS_ADD: +2.50
OD_ADD: +2.50
OD_VA1: 20/20
OS_VA1: 20/20
OD_SPHERE: +0.75
OD_AXIS: 030
OD_SPHERE: +0.75
OS_VA1: 20/20
OS_AXIS: 165
OD_AXIS: 030
OD_ADD: +2.50
OS_ADD: +2.50
OS_SPHERE: +0.25
OS_SPHERE: +0.25
OS_CYLINDER: +1.25
OD_CYLINDER: +1.00
OD_CYLINDER: +1.00
OS_AXIS: 165
OD_VA1: 20/20
OS_CYLINDER: +1.25

## 2025-01-03 ASSESSMENT — LID EXAM ASSESSMENTS
OD_BLEPHARITIS: RUL T
OS_BLEPHARITIS: LUL T

## 2025-01-03 ASSESSMENT — VISUAL ACUITY
OS_BCVA: 20/30
OD_BCVA: 20/20 -1

## 2025-01-03 ASSESSMENT — TONOMETRY
OS_IOP_MMHG: 18
OD_IOP_MMHG: 17
OS_IOP_MMHG: 13

## 2025-01-03 ASSESSMENT — REFRACTION_AUTOREFRACTION
OD_SPHERE: 0.00
OS_CYLINDER: +1.50
OD_CYLINDER: +1.50
OS_AXIS: 166
OS_SPHERE: -0.75
OD_AXIS: 018

## 2025-01-03 ASSESSMENT — PACHYMETRY
OD_CT_UM: 543
OS_CT_CORRECTION: 2
OS_CT_UM: 512
OD_CT_CORRECTION: 0

## 2025-01-03 ASSESSMENT — CONFRONTATIONAL VISUAL FIELD TEST (CVF)
OS_FINDINGS: FULL
OD_FINDINGS: FULL

## 2025-02-14 ENCOUNTER — DOCTOR'S OFFICE (OUTPATIENT)
Dept: URBAN - METROPOLITAN AREA CLINIC 161 | Facility: CLINIC | Age: 75
Setting detail: OPHTHALMOLOGY
End: 2025-02-14
Payer: COMMERCIAL

## 2025-02-14 DIAGNOSIS — H10.45: ICD-10-CM

## 2025-02-14 DIAGNOSIS — H40.1112: ICD-10-CM

## 2025-02-14 DIAGNOSIS — H40.012: ICD-10-CM

## 2025-02-14 PROCEDURE — 92012 INTRM OPH EXAM EST PATIENT: CPT | Performed by: OPTOMETRIST

## 2025-02-14 ASSESSMENT — REFRACTION_MANIFEST
OD_AXIS: 030
OS_ADD: +2.50
OD_ADD: +2.50
OD_CYLINDER: +1.00
OD_ADD: +2.50
OD_CYLINDER: +1.00
OS_AXIS: 165
OD_SPHERE: +0.75
OS_VA1: 20/20
OS_VA1: 20/20
OD_SPHERE: +0.75
OS_SPHERE: +0.25
OS_ADD: +2.50
OS_SPHERE: +0.25
OD_AXIS: 030
OS_CYLINDER: +1.25
OS_CYLINDER: +1.25
OD_VA1: 20/20
OS_AXIS: 165
OD_VA1: 20/20

## 2025-02-14 ASSESSMENT — KERATOMETRY
OD_K1POWER_DIOPTERS: 36.00
OD_AXISANGLE_DEGREES: 006
OS_AXISANGLE_DEGREES: 156
OS_K2POWER_DIOPTERS: 44.00
OD_K2POWER_DIOPTERS: 43.50
OS_K1POWER_DIOPTERS: 43.00

## 2025-02-14 ASSESSMENT — REFRACTION_CURRENTRX
OD_CYLINDER: SPH
OS_CYLINDER: SPH
OS_SPHERE: +3.00
OD_SPHERE: +3.25
OS_OVR_VA: 20/
OS_VPRISM_DIRECTION: SV
OD_OVR_VA: 20/
OD_VPRISM_DIRECTION: SV

## 2025-02-14 ASSESSMENT — VISUAL ACUITY
OD_BCVA: 20/20 -1
OS_BCVA: 20/20-1

## 2025-02-14 ASSESSMENT — REFRACTION_AUTOREFRACTION
OD_AXIS: 018
OS_CYLINDER: +1.50
OD_SPHERE: 0.00
OS_SPHERE: -0.75
OS_AXIS: 166
OD_CYLINDER: +1.25

## 2025-02-14 ASSESSMENT — PACHYMETRY
OD_CT_CORRECTION: 0
OS_CT_UM: 512
OS_CT_CORRECTION: 2
OD_CT_UM: 543

## 2025-02-14 ASSESSMENT — LID EXAM ASSESSMENTS
OD_BLEPHARITIS: RUL T
OS_BLEPHARITIS: LUL T

## 2025-02-14 ASSESSMENT — CONFRONTATIONAL VISUAL FIELD TEST (CVF)
OD_FINDINGS: FULL
OS_FINDINGS: FULL

## 2025-02-14 ASSESSMENT — TEAR BREAK UP TIME (TBUT)
OS_TBUT: 7SEC
OD_TBUT: 7SEC

## 2025-02-14 ASSESSMENT — TONOMETRY: OS_IOP_MMHG: 15

## 2025-04-04 ENCOUNTER — DOCTOR'S OFFICE (OUTPATIENT)
Dept: URBAN - METROPOLITAN AREA CLINIC 161 | Facility: CLINIC | Age: 75
Setting detail: OPHTHALMOLOGY
End: 2025-04-04
Payer: COMMERCIAL

## 2025-04-04 DIAGNOSIS — H40.1112: ICD-10-CM

## 2025-04-04 PROCEDURE — 99212 OFFICE O/P EST SF 10 MIN: CPT | Performed by: OPTOMETRIST

## 2025-04-04 ASSESSMENT — REFRACTION_MANIFEST
OD_SPHERE: +0.75
OS_VA1: 20/20
OD_ADD: +2.50
OD_ADD: +2.50
OS_AXIS: 165
OD_VA1: 20/20
OS_ADD: +2.50
OD_VA1: 20/20
OS_CYLINDER: +1.25
OS_VA1: 20/20
OD_CYLINDER: +1.00
OS_SPHERE: +0.25
OD_SPHERE: +0.75
OD_CYLINDER: +1.00
OD_AXIS: 030
OS_SPHERE: +0.25
OD_AXIS: 030
OS_ADD: +2.50
OS_AXIS: 165
OS_CYLINDER: +1.25

## 2025-04-04 ASSESSMENT — TONOMETRY
OD_IOP_MMHG: 15
OS_IOP_MMHG: 14

## 2025-04-04 ASSESSMENT — REFRACTION_AUTOREFRACTION
OD_AXIS: 018
OD_CYLINDER: +1.25
OS_AXIS: 166
OS_CYLINDER: +1.50
OS_SPHERE: -0.75
OD_SPHERE: 0.00

## 2025-04-04 ASSESSMENT — KERATOMETRY
OS_AXISANGLE_DEGREES: 156
OS_K2POWER_DIOPTERS: 44.00
OD_AXISANGLE_DEGREES: 006
OS_K1POWER_DIOPTERS: 43.00
OD_K2POWER_DIOPTERS: 43.50
OD_K1POWER_DIOPTERS: 36.00

## 2025-04-04 ASSESSMENT — CONFRONTATIONAL VISUAL FIELD TEST (CVF)
OS_FINDINGS: FULL
OD_FINDINGS: FULL

## 2025-04-04 ASSESSMENT — TEAR BREAK UP TIME (TBUT)
OS_TBUT: 7SEC
OD_TBUT: 7SEC

## 2025-04-04 ASSESSMENT — REFRACTION_CURRENTRX
OS_VPRISM_DIRECTION: SV
OS_CYLINDER: SPH
OS_SPHERE: +3.00
OD_OVR_VA: 20/
OD_SPHERE: +3.25
OS_OVR_VA: 20/
OD_CYLINDER: SPH
OD_VPRISM_DIRECTION: SV

## 2025-04-04 ASSESSMENT — LID EXAM ASSESSMENTS
OS_BLEPHARITIS: LUL T
OD_BLEPHARITIS: RUL T

## 2025-04-04 ASSESSMENT — PACHYMETRY
OD_CT_CORRECTION: 0
OS_CT_CORRECTION: 2
OD_CT_UM: 543
OS_CT_UM: 512

## 2025-04-04 ASSESSMENT — VISUAL ACUITY
OD_BCVA: 20/20 -2
OS_BCVA: 20/25-1

## 2025-08-14 ENCOUNTER — RX ONLY (RX ONLY)
Age: 75
End: 2025-08-14

## 2025-08-14 ENCOUNTER — DOCTOR'S OFFICE (OUTPATIENT)
Dept: URBAN - METROPOLITAN AREA CLINIC 161 | Facility: CLINIC | Age: 75
Setting detail: OPHTHALMOLOGY
End: 2025-08-14
Payer: COMMERCIAL

## 2025-08-14 DIAGNOSIS — H40.1112: ICD-10-CM

## 2025-08-14 DIAGNOSIS — H01.001: ICD-10-CM

## 2025-08-14 DIAGNOSIS — H01.004: ICD-10-CM

## 2025-08-14 DIAGNOSIS — H04.123: ICD-10-CM

## 2025-08-14 PROCEDURE — 92133 CPTRZD OPH DX IMG PST SGM ON: CPT | Performed by: OPTOMETRIST

## 2025-08-14 PROCEDURE — 92014 COMPRE OPH EXAM EST PT 1/>: CPT | Performed by: OPTOMETRIST

## 2025-08-14 ASSESSMENT — REFRACTION_MANIFEST
OS_VA1: 20/20
OS_CYLINDER: +1.25
OS_VA1: 20/20
OS_SPHERE: +0.25
OD_AXIS: 030
OD_SPHERE: +0.75
OD_ADD: +2.50
OD_SPHERE: +0.75
OS_ADD: +2.50
OS_CYLINDER: +1.25
OS_AXIS: 165
OD_VA1: 20/20
OD_VA1: 20/20
OS_ADD: +2.50
OD_ADD: +2.50
OS_SPHERE: +0.25
OS_AXIS: 165
OD_CYLINDER: +1.00
OD_CYLINDER: +1.00
OD_AXIS: 030

## 2025-08-14 ASSESSMENT — REFRACTION_CURRENTRX
OS_SPHERE: +3.00
OD_CYLINDER: SPH
OD_SPHERE: +3.25
OS_OVR_VA: 20/
OS_VPRISM_DIRECTION: SV
OS_CYLINDER: SPH
OD_OVR_VA: 20/
OD_VPRISM_DIRECTION: SV

## 2025-08-14 ASSESSMENT — KERATOMETRY
OS_K1POWER_DIOPTERS: 43.00
OD_K2POWER_DIOPTERS: 43.50
OD_AXISANGLE_DEGREES: 006
OS_K2POWER_DIOPTERS: 44.00
OS_AXISANGLE_DEGREES: 156
OD_K1POWER_DIOPTERS: 36.00

## 2025-08-14 ASSESSMENT — PACHYMETRY
OD_CT_UM: 543
OD_CT_CORRECTION: 0
OS_CT_CORRECTION: 2
OS_CT_UM: 512

## 2025-08-14 ASSESSMENT — REFRACTION_AUTOREFRACTION
OD_CYLINDER: +1.00
OD_SPHERE: 0.00
OD_AXIS: 018
OS_SPHERE: -0.75
OS_AXIS: 166
OS_CYLINDER: +1.25

## 2025-08-14 ASSESSMENT — TEAR BREAK UP TIME (TBUT)
OD_TBUT: 7SEC
OS_TBUT: 7SEC

## 2025-08-14 ASSESSMENT — LID EXAM ASSESSMENTS
OS_BLEPHARITIS: LUL T
OD_BLEPHARITIS: RUL T

## 2025-08-14 ASSESSMENT — CONFRONTATIONAL VISUAL FIELD TEST (CVF)
OD_FINDINGS: FULL
OS_FINDINGS: FULL

## 2025-08-14 ASSESSMENT — TONOMETRY
OD_IOP_MMHG: 16
OS_IOP_MMHG: 14

## 2025-08-14 ASSESSMENT — VISUAL ACUITY
OD_BCVA: 20/20
OS_BCVA: 20/20